# Patient Record
Sex: FEMALE | Race: WHITE | NOT HISPANIC OR LATINO | URBAN - METROPOLITAN AREA
[De-identification: names, ages, dates, MRNs, and addresses within clinical notes are randomized per-mention and may not be internally consistent; named-entity substitution may affect disease eponyms.]

---

## 2022-01-01 ENCOUNTER — EMERGENCY (EMERGENCY)
Facility: HOSPITAL | Age: 87
LOS: 1 days | End: 2022-01-01
Attending: EMERGENCY MEDICINE | Admitting: EMERGENCY MEDICINE
Payer: MEDICARE

## 2022-01-01 VITALS — WEIGHT: 130.07 LBS | HEIGHT: 61 IN

## 2022-01-01 PROCEDURE — 99283 EMERGENCY DEPT VISIT LOW MDM: CPT

## 2022-01-01 PROCEDURE — 82962 GLUCOSE BLOOD TEST: CPT

## 2022-09-05 NOTE — DISCHARGE NOTE FOR THE EXPIRED PATIENT - HOSPITAL COURSE
Pt presented asystole on assisted ventilation via ET . Pt reported found unresponsive by the caregiver around 7am - Upon EMS crew arrival , pt was noted to asystole No shock given but was given Epi x 6 doses. Pt pronounced by Dr Queen @ 870

## 2022-09-05 NOTE — ED PROVIDER NOTE - PRO INTERPRETER NEED 2
Benign essential HTN. Maintain < 2 Gm Na a day diet, and monitor BP at home; keep a log.    Pure hypercholesterolemia. Maintain low fat high fiber diet, exercise regularly. Cont lovastatin, and zetia, and citrucel.     Add Questran 1 scoop/8 oz water daily; if tolerated after 1 week can increase to 2x a day; can use softener otc prn.    Coronary arteriosclerosis; has been stable; sees Dr Jeffrey; EST next yr.     Impaired glucose tolerance; exercise w weight reduction.    PSVT (paroxysmal supraventricular tachycardia); limit her caffeine.    Morbid obesity due to excess calories. Caloric restriction w regular exercise and weight reduction.    Bilateral leg edema. Maintain less than 2 g sodium diet; elevate lower extremities at home; use compression stockings if possible.    Urinary tract infection without hematuria, site unspecified; ressolve w cipro short course; repeat U/A dip.  -     POCT URINE DIPSTICK WITH MICROSCOPE, AUTOMATED    Benign colon polyps; cont ASA 81 mg a day; high fiber diet; TC due 2669-1561; need copy of Dr Syed's records as he's retiring; including TC and path results.    Labs few days before follow-up at [facility]; overnight fast before the labs.  
English

## 2022-09-05 NOTE — ED ADULT NURSE REASSESSMENT NOTE - NS ED NURSE REASSESS COMMENT FT1
Bladder scan done -result reported  more than 540 ml . Dr Queen made aware. Mederos catheter inserted using sterile technique, draining by gravity, secured with StatLock. Patient put out more than 500 ml of urine

## 2022-09-05 NOTE — ED PROVIDER NOTE - OBJECTIVE STATEMENT
Patient brought in by EMS in cardiac arrest.  Per EMS report at 6 AM patient was noted to be unresponsive by her private aide but was breathing at that time.  At 7AM patient's private aide noticed that she stops breathing.  Per EMS they have been working on patient for half an hour they intubated patient gave 6 epis.  No further history available without response.  pmd - [place]

## 2022-09-05 NOTE — ED PROVIDER NOTE - CLINICAL SUMMARY MEDICAL DECISION MAKING FREE TEXT BOX
Patient brought in by EMS in cardiac arrest.  Per EMS report at 6 AM patient was noted to be unresponsive by her private aide but was breathing at that time.  At 7AM patient's private aide noticed that she stops breathing.  Per EMS they have been working on patient for half an hour, they intubated patient and gave 6 epis without response.  No further history available.  due to prolonged downtime without response to acls, ceased efforts at 0814

## 2022-09-05 NOTE — ED PROVIDER NOTE - CPE EDP SKIN NORM
The Service to Gastroenterology order in workqueue [id number] requested on 11/6/2019 has been removed as unable to contact. Ordering provider has been notified.    Please contact patient if further communication is needed.         normal...

## 2022-09-05 NOTE — ED ADULT NURSE NOTE - OBJECTIVE STATEMENT
Pt BIBA from [facility] for cardiac arrest. Pt reported found unresponsive by her care giver around 7 am this morning. Responding EMS crew initiate ACLS protocol - No shock given by CPR continued - airway / ET inserted to assisted ventilation. Pt reported was asystole upon arrival by the EMS - Pt presented asystole on the ED . Pt pronounced by Dr Queen @ 8:14 Pt PMD called by Dr Queen

## 2022-09-05 NOTE — ED PROVIDER NOTE - NSICDXPASTMEDICALHX_GEN_ALL_CORE_FT
PAST MEDICAL HISTORY:  Dementia     HTN (hypertension)     Hyperlipidemia     Hypothyroidism     Rheumatoid arthritis

## 2022-09-05 NOTE — ED ADULT TRIAGE NOTE - CHIEF COMPLAINT QUOTE
Brought in by [facility] EMS, in cardiac arrest, ACLS protocol in progress, Thumper in use

## 2022-09-06 LAB — GLUCOSE BLDC GLUCOMTR-MCNC: 210 MG/DL — HIGH (ref 70–99)
